# Patient Record
Sex: MALE | URBAN - METROPOLITAN AREA
[De-identification: names, ages, dates, MRNs, and addresses within clinical notes are randomized per-mention and may not be internally consistent; named-entity substitution may affect disease eponyms.]

---

## 2018-04-26 PROBLEM — D63.8 ANEMIA, CHRONIC DISEASE: Status: ACTIVE | Noted: 2017-01-04

## 2018-04-26 PROBLEM — D75.89 MACROCYTOSIS: Status: ACTIVE | Noted: 2017-07-18

## 2019-01-17 PROCEDURE — 84153 ASSAY OF PSA TOTAL: CPT | Performed by: INTERNAL MEDICINE

## 2019-01-17 PROCEDURE — 84154 ASSAY OF PSA FREE: CPT | Performed by: INTERNAL MEDICINE

## 2020-11-05 PROBLEM — Z85.46 HISTORY OF PROSTATE CANCER: Status: ACTIVE | Noted: 2020-11-05

## 2021-06-23 PROBLEM — E53.8 VITAMIN B12 DEFICIENCY: Status: ACTIVE | Noted: 2021-06-23

## 2021-06-23 PROBLEM — I95.1 ORTHOSTATIC HYPOTENSION: Status: ACTIVE | Noted: 2021-06-23

## 2021-07-29 PROBLEM — I35.1 NONRHEUMATIC AORTIC VALVE INSUFFICIENCY: Status: ACTIVE | Noted: 2021-07-29

## 2022-02-06 PROBLEM — R09.89 LABILE HYPERTENSION: Status: ACTIVE | Noted: 2022-02-06

## 2022-12-14 ENCOUNTER — APPOINTMENT (OUTPATIENT)
Dept: URBAN - METROPOLITAN AREA CLINIC 240 | Age: 77
Setting detail: DERMATOLOGY
End: 2022-12-20

## 2022-12-14 DIAGNOSIS — L82.0 INFLAMED SEBORRHEIC KERATOSIS: ICD-10-CM

## 2022-12-14 PROCEDURE — OTHER LIQUID NITROGEN: OTHER

## 2022-12-14 PROCEDURE — OTHER MIPS QUALITY: OTHER

## 2022-12-14 PROCEDURE — 17110 DESTRUCT B9 LESION 1-14: CPT

## 2022-12-14 ASSESSMENT — LOCATION ZONE DERM
LOCATION ZONE: SCALP
LOCATION ZONE: LEG

## 2022-12-14 ASSESSMENT — LOCATION SIMPLE DESCRIPTION DERM
LOCATION SIMPLE: LEFT POPLITEAL SKIN
LOCATION SIMPLE: RIGHT SCALP

## 2022-12-14 ASSESSMENT — LOCATION DETAILED DESCRIPTION DERM
LOCATION DETAILED: LEFT POPLITEAL SKIN
LOCATION DETAILED: RIGHT CENTRAL FRONTAL SCALP

## 2022-12-14 NOTE — PROCEDURE: MIPS QUALITY
Quality 111:Pneumonia Vaccination Status For Older Adults: Pneumococcal vaccine (PPSV23) administered on or after patient’s 60th birthday and before the end of the measurement period
Detail Level: Detailed
Quality 226: Preventive Care And Screening: Tobacco Use: Screening And Cessation Intervention: Patient screened for tobacco use and is an ex/non-smoker
Quality 130: Documentation Of Current Medications In The Medical Record: Current Medications Documented
Quality 110: Preventive Care And Screening: Influenza Immunization: Influenza Immunization previously received during influenza season
Quality 431: Preventive Care And Screening: Unhealthy Alcohol Use - Screening: Patient not identified as an unhealthy alcohol user when screened for unhealthy alcohol use using a systematic screening method

## 2022-12-14 NOTE — PROCEDURE: LIQUID NITROGEN
Application Tool (Optional): Liquid Nitrogen Sprayer
Medical Necessity Clause: This procedure was medically necessary because the lesions that were treated were:
Show Aperture Variable?: Yes
Duration Of Freeze Thaw-Cycle (Seconds): 5-10
Spray Paint Text: The liquid nitrogen was applied to the skin utilizing a spray paint frosting technique.
Medical Necessity Information: It is in your best interest to select a reason for this procedure from the list below. All of these items fulfill various CMS LCD requirements except the new and changing color options.
Spray Paint Technique: No
Consent: The patient's consent was obtained including but not limited to risks of crusting, scabbing, blistering, scarring, darker or lighter pigmentary change, recurrence, incomplete removal and infection.
Post-Care Instructions: I reviewed with the patient in detail post-care instructions. Patient is to wear sunprotection, and avoid picking at any of the treated lesions. Pt may apply Vaseline to crusted or scabbing areas.
Number Of Freeze-Thaw Cycles: 1 freeze-thaw cycle
Detail Level: Detailed

## 2024-05-20 RX ORDER — CARVEDILOL 6.25 MG/1
6.25 TABLET ORAL 2 TIMES DAILY
COMMUNITY
Start: 2022-08-11

## 2024-05-20 RX ORDER — DOXYCYCLINE HYCLATE 50 MG/1
CAPSULE ORAL
COMMUNITY
Start: 2023-05-27

## 2024-05-20 RX ORDER — IBUPROFEN 600 MG/1
TABLET ORAL
COMMUNITY
Start: 2023-05-27

## 2024-05-20 RX ORDER — ONDANSETRON 4 MG/1
1 TABLET, FILM COATED ORAL EVERY 8 HOURS PRN
COMMUNITY
Start: 2024-01-31

## 2024-05-20 RX ORDER — FINASTERIDE 5 MG/1
5 TABLET, FILM COATED ORAL DAILY
COMMUNITY
Start: 2023-12-12

## 2024-05-20 RX ORDER — CEPHALEXIN 500 MG/1
500 CAPSULE ORAL 3 TIMES DAILY
COMMUNITY
Start: 2022-09-28

## 2024-05-20 RX ORDER — MIRABEGRON 25 MG/1
TABLET, FILM COATED, EXTENDED RELEASE ORAL
COMMUNITY
Start: 2022-04-14

## 2024-05-21 ENCOUNTER — OFFICE VISIT (OUTPATIENT)
Dept: SURGERY | Facility: CLINIC | Age: 79
End: 2024-05-21

## 2024-05-21 VITALS — DIASTOLIC BLOOD PRESSURE: 67 MMHG | SYSTOLIC BLOOD PRESSURE: 110 MMHG | HEART RATE: 88 BPM

## 2024-05-21 DIAGNOSIS — M17.0 PRIMARY OSTEOARTHRITIS OF BOTH KNEES: ICD-10-CM

## 2024-05-21 DIAGNOSIS — G89.29 CHRONIC BILATERAL LOW BACK PAIN WITH LEFT-SIDED SCIATICA: Primary | ICD-10-CM

## 2024-05-21 DIAGNOSIS — M54.42 CHRONIC BILATERAL LOW BACK PAIN WITH LEFT-SIDED SCIATICA: Primary | ICD-10-CM

## 2024-05-21 PROCEDURE — 99214 OFFICE O/P EST MOD 30 MIN: CPT

## 2024-05-21 NOTE — PROGRESS NOTES
[facility]        [facility]      [address]  [address]  [place] [address]    PHONE  [phone]          FAX  [phone]    [email or web address]      OFFICE FOLLOW-UP NOTE            Dirk Rider    : 1945    MRN: [id number]  CSN: [id number]      PCP: Amy Nassar MD  Referring Provider: No ref. provider found    Insurance: Payor: [employer] MED[employer] ADVANTAGE / Plan: [employer] [place] PPO / Product Type: PPO /           Date of Visit: 2024    Reason for Visit:   Chief Complaint    Follow - Up                         History of Present Care:  Dirk Rider is a a(n) 78 year old, male returns to my office.  We previously talked about L4-5 left facetectomy and interbody fusion for L4 nerve entrapment.  Patient has considerable orthopedic concerns in bilateral knees and hips for which she is visiting with a joint replacement specialist.  He has not determined whether he will move forward with any surgical intervention.  Today he presents for numbness in the bilateral feet and recent bout of loss of bladder 2 weeks ago.  Patient continues to endorse intermittent low back pain.  He is utilizing a wheelchair today because he is unable to walk due to pain in the bilateral knees.  He denies right lower extremity pain.      History:  Past Medical History:    Aortic aneurysm, abdominal (HCC)    Aortic aneurysm, thoracic (HCC)    Depression    Prostate cancer (HCC)    >5 yrs ago      Past Surgical History:   Procedure Laterality Date    Cabg, arterial, two      Ascending Aortic Dissection by Dr. Moses    Laparoscopic inguinal hernia repair Bilateral     Us biopsy prostate (fdq=[id number])      >8 yrs ago by Dr. Swan x 2      No family history on file.   Social History     Socioeconomic History    Marital status:      Spouse name: Not on file    Number of children: Not on file    Years of  education: Not on file    Highest education level: Not on file   Occupational History    Not on file   Tobacco Use    Smoking status: Never    Smokeless tobacco: Never   Substance and Sexual Activity    Alcohol use: Yes     Comment: less than 1 per week    Drug use: No    Sexual activity: Not on file   Other Topics Concern    Not on file   Social History Narrative    Not on file     Social Determinants of Health     Financial Resource Strain: Not on file   Food Insecurity: Not on file   Transportation Needs: Not on file   Physical Activity: Not on file   Stress: Not on file   Social Connections: Unknown (3/18/2021)    Received from [facility]    Social Connections     Conversations with friends/family/neighbors per week: Not on file   Housing Stability: Low Risk  (7/8/2021)    Received from [facility]    Housing Stability     Mortgage Payment Concerns?: Not on file     Number of Places Lived in the Last Year: Not on file     Unstable Housing?: Not on file        Allergies:  Allergies   Allergen Reactions    Ciprofloxacin UNKNOWN         Medications:  Current Outpatient Medications   Medication Sig Dispense Refill    ascorbic acid 250 MG Oral Tab Take 1 tablet (250 mg total) by mouth daily.      carvedilol 6.25 MG Oral Tab Take 1 tablet (6.25 mg total) by mouth 2 (two) times daily.      cephalexin 500 MG Oral Cap Take 1 capsule (500 mg total) by mouth in the morning and 1 capsule (500 mg total) at noon and 1 capsule (500 mg total) in the evening.      doxycycline 50 MG Oral Cap TAKE 2 CAPSULES BY MOUTH TWICE DAILY FOR 10 DAYS      finasteride 5 MG Oral Tab Take 1 tablet (5 mg total) by mouth daily.      ibuprofen 600 MG Oral Tab TAKE 1 TABLET BY MOUTH EVERY 6 HOURS FOR 5 DAYS AS NEEDED FOR PAIN OR FEVER      metoprolol tartrate 25 MG Oral Tab       Mirabegron ER (MYRBETRIQ) 25 MG Oral Tablet 24 Hr       ondansetron (ZOFRAN) 4 mg tablet Take 1 tablet (4 mg total) by mouth every  8 (eight) hours as needed.      TURMERIC OR Take by mouth 2 (two) times daily.      tamsulosin (FLOMAX) cap Take 1 capsule (0.4 mg total) by mouth daily. 90 capsule 3    Cholecalciferol 125 MCG (5000 UT) Oral Tab Take 3 tablets (15,000 Units total) by mouth daily.      hydrALAZINE 25 MG Oral Tab Take 1 tablet (25 mg total) by mouth 2 (two) times a day. Patient is to take the Hydralazine 25 mg twice per day as needed for SBP > 150.  Patient is to take BP measurements twice per day. 60 tablet 0    aspirin 81 MG Oral Tab EC Take 1 tablet (81 mg total) by mouth daily.      Phenelzine Sulfate 15 MG Oral Tab Take 7 tablets (105 mg total) by mouth daily.          Review of Systems:  A 10-point system was reviewed.  Pertinent positives and negatives are noted in HPI.      Physical Exam:  /67   Pulse 88         Neurological Exam:    AAOx3, following commands  PERRLA  EOMI  Face symmetrical  Tongue midline  Hearing symmetrical and intact to finger rub    No rhinorrhea or otorrhea    Romberg negative    Motor Strength:  Bilateral lower extremity iliopsoas 5 out of 5, knee extension 5 out of 5, dorsiflexion minus 5 out of 5 in plantarflexion 5 out of 5    Sensation to light touch:  Numbness in the bilateral lower extremity feet and symmetric    DTRs:  2+ out of 4 in bilateral upper and lower extremities throughout    Abdomen:  Soft, non-distended, non-tender, with no rebound or guarding.  No peritoneal signs.     Extremities:  Non-tender, no lower extremity edema noted.      Labs:  CBC:  Lab Results   Component Value Date    WBC 6.42 12/02/2021    HGB 11.4 (L) 12/02/2021    HEMOGLOBIN 11.8 (L) 07/23/2015    HCT 36.3 (L) 12/02/2021    .7 (H) 12/02/2021     12/02/2021      BMG:  Lab Results   Component Value Date     02/09/2022    K 5.36 (H) 02/09/2022    CO2 24.6 02/09/2022     02/09/2022    BUN 24.0 (H) 02/09/2022      INR:  No results found for: \"INR\", \"PROTIME\"       Diagnostics:  No imaging  to review    Diagnosis:  1. Chronic bilateral low back pain with left-sided sciatica  - MRI SPINE LUMBAR (CPT=72148); Future    2. Primary osteoarthritis of both knees      Assessment/Plan:  Dirk Rider returns to the office for continued discussion of low back pain and recent bout of loss of bladder.  Patient also having concerns with numbness in the bilateral feet consistent with peripheral neuropathy.  Due to the recent loss of bladder would like to update an MRI of the lumbar spine as I was not able to review it today.  There would be concern for cauda equina syndrome in the setting of his loss of bladder.  Will plan to have patient return to the office after imaging is completed for review and discussion.    More than 30 minutes were spent during this visit and the coordination of this patient's care. All questions and concerns were addressed. We appreciate the opportunity to participate in the care of this patient. Please do not hesitate to call our office ([phone]) with any issues.    This note has been dictated utilizing voice recognition software. Unfortunately, this may lead to occasional typographical errors. If there are any questions regarding this, please do not hesitate to contact our office.       Nuno Low PA-C    5/21/2024  9:50 AM

## 2024-05-21 NOTE — PROGRESS NOTES
Last office visit: 12/12/23  Pain Level: 0/10. Patient reports to be feeling no pain at the moment however did mention that he has had 2 recent falls due to not being able to hold his balance.   Numbness / Tingling: Patient reports numbness and tingling on both feet.  Physical Therapy / Injections: Patient denies completing any recent Physical Therapy / Injections.

## 2024-05-21 NOTE — PATIENT INSTRUCTIONS
Refill policies:    Allow 2-3 business days for refills; controlled substances may take longer.  Contact your pharmacy at least 5 days prior to running out of medication and have them send an electronic request or submit request through the “request refill” option in your [employer] account.  Refills are not addressed on weekends; covering physicians do not authorize routine medications on weekends.  No narcotics or controlled substances are refilled after noon on Fridays or by on call physicians.  By law, narcotics must be electronically prescribed.  A 30 day supply with no refills is the maximum allowed.  If your prescription is due for a refill, you may be due for a follow up appointment.  To best provide you care, patients receiving routine medications need to be seen at least once a year.  Patients receiving narcotic/controlled substance medications need to be seen at least once every 3 months.  In the event that your preferred pharmacy does not have the requested medication in stock (e.g. Backordered), it is your responsibility to find another pharmacy that has the requested medication available.  We will gladly send a new prescription to that pharmacy at your request.    Scheduling Tests:    If your physician has ordered radiology tests such as MRI or CT scans, please contact Central Scheduling at [phone] right away to schedule the test.  Once scheduled, the [facility] Centralized Referral Team will work with your insurance carrier to obtain pre-certification or prior authorization.  Depending on your insurance carrier, approval may take 3-10 days.  It is highly recommended patients assure they have received an authorization before having a test performed.  If test is done without insurance authorization, patient may be responsible for the entire amount billed.      Precertification and Prior Authorizations:  If your physician has recommended that you have a procedure or additional testing performed the [facility]  Centralized Referral Team will contact your insurance carrier to obtain pre-certification or prior authorization.    You are strongly encouraged to contact your insurance carrier to verify that your procedure/test has been approved and is a COVERED benefit.  Although the [facility] Centralized Referral Team does its due diligence, the insurance carrier gives the disclaimer that \"Although the procedure is authorized, this does not guarantee payment.\"    Ultimately the patient is responsible for payment.   Thank you for your understanding in this matter.  Paperwork Completion:  If you require FMLA or disability paperwork for your recovery, please make sure to either drop it off or have it faxed to our office at [phone]. Be sure the form has your name and date of birth on it.  The form will be faxed to our Forms Department and they will complete it for you.  There is a 25$ fee for all forms that need to be filled out.  Please be aware there is a 10-14 day turnaround time.  You will need to sign a release of information (SHREYAS) form if your paperwork does not come with one.  You may call the Forms Department with any questions at [phone].  Their fax number is [phone].      Dr. Tinajero recommends the following:    Schedule a Lumbar MRI scan without contrast. To schedule an MRI, CT scan or other imaging your provider ordered, please call Central Scheduling at [phone].      Locations    [facility]  [address] [place] [address]    [facility]  [address] [place] [address]    [facility]  [address] [place] [address]  2 wide-bore MRIs, including a 3 Talia (3T) MRI    [facility] [place]  [address] [place] [address]  Wide-bore MRI    [facility] [address]  [address] [place] [address]    [facility]  [address]  [place] [place] [address]  4 wide-bore MRIs, including 2 - 3T MRIs with “Caring Suites”  2 more wide-bore 1.5 Talia (1.5T) MRIs coming this spring    [facility]  [address] [place] [address]    [address] [address]  Wide-bore MRI      You do not need an appointment to have an x-ray done as walk-ins are accepted at any of our lab locations.       Follow up with Dr. Tinajero after imaging scan is complete.

## 2024-06-28 ENCOUNTER — TELEPHONE (OUTPATIENT)
Dept: SURGERY | Facility: CLINIC | Age: 79
End: 2024-06-28

## 2024-07-17 ENCOUNTER — APPOINTMENT (OUTPATIENT)
Dept: URBAN - METROPOLITAN AREA CLINIC 240 | Age: 79
Setting detail: DERMATOLOGY
End: 2024-07-20

## 2024-07-17 VITALS — HEIGHT: 66 IN | WEIGHT: 165 LBS

## 2024-07-17 DIAGNOSIS — D22 MELANOCYTIC NEVI: ICD-10-CM

## 2024-07-17 DIAGNOSIS — D18.0 HEMANGIOMA: ICD-10-CM

## 2024-07-17 DIAGNOSIS — L81.4 OTHER MELANIN HYPERPIGMENTATION: ICD-10-CM

## 2024-07-17 DIAGNOSIS — L82.1 OTHER SEBORRHEIC KERATOSIS: ICD-10-CM

## 2024-07-17 DIAGNOSIS — Z71.89 OTHER SPECIFIED COUNSELING: ICD-10-CM

## 2024-07-17 PROBLEM — D18.01 HEMANGIOMA OF SKIN AND SUBCUTANEOUS TISSUE: Status: ACTIVE | Noted: 2024-07-17

## 2024-07-17 PROBLEM — D22.5 MELANOCYTIC NEVI OF TRUNK: Status: ACTIVE | Noted: 2024-07-17

## 2024-07-17 PROCEDURE — 99213 OFFICE O/P EST LOW 20 MIN: CPT

## 2024-07-17 PROCEDURE — OTHER COUNSELING: OTHER

## 2024-07-17 PROCEDURE — OTHER MIPS QUALITY: OTHER

## 2024-07-17 ASSESSMENT — LOCATION DETAILED DESCRIPTION DERM
LOCATION DETAILED: RIGHT INFERIOR LATERAL MIDBACK
LOCATION DETAILED: RIGHT INFERIOR LATERAL LOWER BACK
LOCATION DETAILED: RIGHT SUPERIOR LATERAL MIDBACK
LOCATION DETAILED: RIGHT INFERIOR LATERAL UPPER BACK
LOCATION DETAILED: RIGHT LATERAL UPPER BACK

## 2024-07-17 ASSESSMENT — LOCATION SIMPLE DESCRIPTION DERM
LOCATION SIMPLE: RIGHT LOWER BACK
LOCATION SIMPLE: RIGHT UPPER BACK

## 2024-07-17 ASSESSMENT — LOCATION ZONE DERM: LOCATION ZONE: TRUNK

## 2024-10-18 ENCOUNTER — TELEPHONE (OUTPATIENT)
Dept: SURGERY | Facility: CLINIC | Age: 79
End: 2024-10-18

## 2024-10-18 ENCOUNTER — MED REC SCAN ONLY (OUTPATIENT)
Dept: SURGERY | Facility: CLINIC | Age: 79
End: 2024-10-18

## 2024-10-18 NOTE — TELEPHONE ENCOUNTER
Patient dropped off imaging disc and report from [place] at the [place] office for Dr. Tinajero to review.     08/14/2024 - MR Spine Lumbar WO Contrast     Film uploaded to PACS.   Copy of report made and sent to the scanning department.   Disc and report will be return to the patient at his follow up appointment.   Follow up appointment not made.

## 2024-11-18 RX ORDER — TESTOSTERONE CYPIONATE, MICRO 100 %
POWDER (GRAM) MISCELLANEOUS
COMMUNITY
Start: 2024-10-10

## 2024-11-18 NOTE — PATIENT INSTRUCTIONS
Refill policies:    Allow 2-3 business days for refills; controlled substances may take longer.  Contact your pharmacy at least 5 days prior to running out of medication and have them send an electronic request or submit request through the “request refill” option in your "[employer] account.  Refills are not addressed on weekends; covering physicians do not authorize routine medications on weekends.  No narcotics or controlled substances are refilled after noon on Fridays or by on call physicians.  By law, narcotics must be electronically prescribed.  A 30 day supply with no refills is the maximum allowed.  If your prescription is due for a refill, you may be due for a follow up appointment.  To best provide you care, patients receiving routine medications need to be seen at least once a year.  Patients receiving narcotic/controlled substance medications need to be seen at least once every 3 months.  In the event that your preferred pharmacy does not have the requested medication in stock (e.g. Backordered), it is your responsibility to find another pharmacy that has the requested medication available.  We will gladly send a new prescription to that pharmacy at your request.    Scheduling Tests:    If your physician has ordered radiology tests such as MRI or CT scans, please contact Central Scheduling at [phone] right away to schedule the test.  Once scheduled, the [facility] Centralized Referral Team will work with your insurance carrier to obtain pre-certification or prior authorization.  Depending on your insurance carrier, approval may take 3-10 days.  It is highly recommended patients assure they have received an authorization before having a test performed.  If test is done without insurance authorization, patient may be responsible for the entire amount billed.      Precertification and Prior Authorizations:  If your physician has recommended that you have a procedure or additional testing performed the [facility]  Centralized Referral Team will contact your insurance carrier to obtain pre-certification or prior authorization.    You are strongly encouraged to contact your insurance carrier to verify that your procedure/test has been approved and is a COVERED benefit.  Although the [facility] Centralized Referral Team does its due diligence, the insurance carrier gives the disclaimer that \"Although the procedure is authorized, this does not guarantee payment.\"    Ultimately the patient is responsible for payment.   Thank you for your understanding in this matter.  Paperwork Completion:  If you require FMLA or disability paperwork for your recovery, please make sure to either drop it off or have it faxed to our office at [phone]. Be sure the form has your name and date of birth on it.  The form will be faxed to our Forms Department and they will complete it for you.  There is a 25$ fee for all forms that need to be filled out.  Please be aware there is a 10-14 day turnaround time.  You will need to sign a release of information (SHREYAS) form if your paperwork does not come with one.  You may call the Forms Department with any questions at [phone].  Their fax number is [phone].

## 2024-11-19 ENCOUNTER — OFFICE VISIT (OUTPATIENT)
Dept: SURGERY | Facility: CLINIC | Age: 79
End: 2024-11-19
Payer: MEDICARE

## 2024-11-19 VITALS
DIASTOLIC BLOOD PRESSURE: 72 MMHG | HEART RATE: 73 BPM | BODY MASS INDEX: 26.84 KG/M2 | WEIGHT: 171 LBS | SYSTOLIC BLOOD PRESSURE: 110 MMHG | HEIGHT: 67 IN

## 2024-11-19 DIAGNOSIS — M17.0 PRIMARY OSTEOARTHRITIS OF BOTH KNEES: ICD-10-CM

## 2024-11-19 DIAGNOSIS — G89.29 CHRONIC BILATERAL LOW BACK PAIN WITH LEFT-SIDED SCIATICA: Primary | ICD-10-CM

## 2024-11-19 DIAGNOSIS — M54.42 CHRONIC BILATERAL LOW BACK PAIN WITH LEFT-SIDED SCIATICA: Primary | ICD-10-CM

## 2024-11-19 PROCEDURE — 1159F MED LIST DOCD IN RCRD: CPT

## 2024-11-19 PROCEDURE — 1160F RVW MEDS BY RX/DR IN RCRD: CPT

## 2024-11-19 PROCEDURE — 99214 OFFICE O/P EST MOD 30 MIN: CPT

## 2024-11-19 NOTE — PROGRESS NOTES
[facility]        [facility]      [address]  [address]  [place] [address]    PHONE  [phone]          FAX  [phone]    [email or web address]      OFFICE FOLLOW-UP NOTE            Dirk Rider    : 1945    MRN: [id number]  CSN: [id number]      PCP: Amy Nassar MD  Referring Provider: No ref. provider found    Insurance: Payor: [employer]VANTAGE / Plan: [employer] [place] PPO / Product Type: PPO /           Date of Visit: 2024    Reason for Visit:   Chief Complaint    Follow - Up; Low Back Pain                         History of Present Care:  Dirk Rider is a a(n) 79 year old, male returns office for MRI of the lumbar spine for review.  Patient has had a difficult time with walking as well as balance issues over the past few months.  Patient has considerable orthopedic concerns bilateral knees and hips which he is visiting with orthopedic joint specialist for.  Overall, patient denies significant back pain or radiating lower extremity pain.  Patient reports constant need to urine with an unsteady stream.  He denies saddle anesthesia.      History:  .  Past Medical History:    Aortic aneurysm, abdominal (HCC)    Aortic aneurysm, thoracic (HCC)    Depression    Prostate cancer (HCC)    >5 yrs ago      Past Surgical History:   Procedure Laterality Date    Cabg, arterial, two  2015    Ascending Aortic Dissection by Dr. Moses    Laparoscopic inguinal hernia repair Bilateral     Us biopsy prostate ([id number])      >8 yrs ago by Dr. Swan x 2      No family history on file.   Social History     Socioeconomic History    Marital status:      Spouse name: Not on file    Number of children: Not on file    Years of education: Not on file    Highest education level: Not on file   Occupational History    Not on file   Tobacco Use    Smoking status: Never    Smokeless tobacco:  Never   Substance and Sexual Activity    Alcohol use: Yes     Comment: less than 1 per week    Drug use: No    Sexual activity: Not on file   Other Topics Concern    Not on file   Social History Narrative    Not on file     Social Drivers of Health     Financial Resource Strain: Not on file   Food Insecurity: Not on file   Transportation Needs: Not on file   Physical Activity: Not on file   Stress: Not on file   Social Connections: Unknown (3/18/2021)    Received from [facility]    Social Connections     Conversations with friends/family/neighbors per week: Not on file   Housing Stability: Low Risk  (7/8/2021)    Received from [facility]    Housing Stability     Mortgage Payment Concerns?: Not on file     Number of Places Lived in the Last Year: Not on file     Unstable Housing?: Not on file        Allergies:  Allergies[1]      Medications:  Current Outpatient Medications   Medication Sig Dispense Refill    Testosterone Cypionate Does not apply Powder       ascorbic acid 250 MG Oral Tab Take 1 tablet (250 mg total) by mouth daily.      carvedilol 6.25 MG Oral Tab Take 1 tablet (6.25 mg total) by mouth 2 (two) times daily.      cephalexin 500 MG Oral Cap Take 1 capsule (500 mg total) by mouth in the morning and 1 capsule (500 mg total) at noon and 1 capsule (500 mg total) in the evening.      doxycycline 50 MG Oral Cap TAKE 2 CAPSULES BY MOUTH TWICE DAILY FOR 10 DAYS      finasteride 5 MG Oral Tab Take 1 tablet (5 mg total) by mouth daily.      ibuprofen 600 MG Oral Tab TAKE 1 TABLET BY MOUTH EVERY 6 HOURS FOR 5 DAYS AS NEEDED FOR PAIN OR FEVER      metoprolol tartrate 25 MG Oral Tab       Mirabegron ER (MYRBETRIQ) 25 MG Oral Tablet 24 Hr       ondansetron (ZOFRAN) 4 mg tablet Take 1 tablet (4 mg total) by mouth every 8 (eight) hours as needed.      TURMERIC OR Take by mouth 2 (two) times daily.      tamsulosin (FLOMAX) cap Take 1 capsule (0.4 mg total) by mouth daily. 90  capsule 3    Cholecalciferol 125 MCG (5000 UT) Oral Tab Take 3 tablets (15,000 Units total) by mouth daily.      hydrALAZINE 25 MG Oral Tab Take 1 tablet (25 mg total) by mouth 2 (two) times a day. Patient is to take the Hydralazine 25 mg twice per day as needed for SBP > 150.  Patient is to take BP measurements twice per day. 60 tablet 0    aspirin 81 MG Oral Tab EC Take 1 tablet (81 mg total) by mouth daily.      Phenelzine Sulfate 15 MG Oral Tab Take 7 tablets (105 mg total) by mouth daily.          Review of Systems:  A 10-point system was reviewed.  Pertinent positives and negatives are noted in HPI.      Physical Exam:  /72 (BP Location: Right arm, Patient Position: Sitting, Cuff Size: adult)   Pulse 73   Ht 67\"   Wt 171 lb (77.6 kg)   BMI 26.78 kg/m²         Neurological Exam:    AAOx3, following commands  PERRLA  EOMI  Face symmetrical  Tongue midline  Hearing symmetrical and intact to finger rub    No rhinorrhea or otorrhea    Romberg negative    Motor Strength:  5 out of 5 in bilateral lower extremities    Sensation to light touch:  Numbness in bilateral feet    DTRs:  2+ out of 4 in bilateral upper and lower extremities      Abdomen:  Soft, non-distended, non-tender, with no rebound or guarding.  No peritoneal signs.     Extremities:  Non-tender, no lower extremity edema noted.      Labs:  CBC:  Lab Results   Component Value Date    WBC 6.42 12/02/2021    HGB 11.4 (L) 12/02/2021    HEMOGLOBIN 11.8 (L) 07/23/2015    HCT 36.3 (L) 12/02/2021    .7 (H) 12/02/2021     12/02/2021      BMG:  Lab Results   Component Value Date     02/09/2022    K 5.36 (H) 02/09/2022    CO2 24.6 02/09/2022     02/09/2022    BUN 24.0 (H) 02/09/2022      INR:  No results found for: \"INR\", \"PROTIME\"       Diagnostics:  MRI lumbar spine reviewed.  Advanced multilevel degenerative changes and scoliosis.  No high-grade canal stenosis.  Severe foraminal stenosis at left L4-5.  Moderate severe foraminal  stenosis at right L1-2 and left L3-4    Diagnosis:  1. Chronic bilateral low back pain with left-sided sciatica  - PHYSICAL THERAPY EXTERNAL    2. Primary osteoarthritis of both knees  - PHYSICAL THERAPY EXTERNAL      Assessment/Plan:  Dirk Rider returns to the office for follow-up.  Patient presents with an MRI for review which we discussed.  Patient with multilevel degenerative changes and scoliosis without significant central canal compromise.  Most significantly patient with severe left foraminal narrowing at L4-5 but currently patient denies significant low back pain or radiating left lower extremity pain.  Patient's main complaint is in regard to his joints.  We discussed with patient that he would have a difficult time with any low back intervention due to his peripheral joint issues.  At this time we will not recommend any surgical intervention.  Patient will follow-up with his orthopedic specialist for continued management of his joint issues.    More than 30 minutes were spent during this visit and the coordination of this patient's care. All questions and concerns were addressed. We appreciate the opportunity to participate in the care of this patient. Please do not hesitate to call our office ([phone]) with any issues.    This note has been dictated utilizing voice recognition software. Unfortunately, this may lead to occasional typographical errors. If there are any questions regarding this, please do not hesitate to contact our office.           Nuno Low PA-C    11/19/2024  3:20 PM       [1]   Allergies  Allergen Reactions    Ciprofloxacin UNKNOWN